# Patient Record
Sex: FEMALE | Race: WHITE | Employment: FULL TIME | URBAN - METROPOLITAN AREA
[De-identification: names, ages, dates, MRNs, and addresses within clinical notes are randomized per-mention and may not be internally consistent; named-entity substitution may affect disease eponyms.]

---

## 2017-07-26 ENCOUNTER — ROUTINE PRENATAL (OUTPATIENT)
Dept: PERINATAL CARE | Age: 27
End: 2017-07-26
Payer: COMMERCIAL

## 2017-07-26 VITALS
SYSTOLIC BLOOD PRESSURE: 121 MMHG | HEIGHT: 67 IN | DIASTOLIC BLOOD PRESSURE: 72 MMHG | TEMPERATURE: 98 F | HEART RATE: 112 BPM | RESPIRATION RATE: 16 BRPM | WEIGHT: 129 LBS | BODY MASS INDEX: 20.25 KG/M2

## 2017-07-26 DIAGNOSIS — O35.8XX0 SUSPECTED DAMAGE TO FETUS FROM OTHER DISEASE IN MOTHER, AFFECTING MANAGEMENT OF MOTHER, ANTEPARTUM CONDITION OR COMPLICATION, NOT APPLICABLE OR UNSPECIFIED FETUS: ICD-10-CM

## 2017-07-26 DIAGNOSIS — O26.12 LOW WEIGHT GAIN IN PREGNANCY, SECOND TRIMESTER: ICD-10-CM

## 2017-07-26 DIAGNOSIS — O99.891 MATERNAL RHEUMATOID ARTHRITIS COMPLICATING PREGNANCY (HCC): Primary | ICD-10-CM

## 2017-07-26 DIAGNOSIS — Z3A.18 18 WEEKS GESTATION OF PREGNANCY: ICD-10-CM

## 2017-07-26 DIAGNOSIS — M06.9 MATERNAL RHEUMATOID ARTHRITIS COMPLICATING PREGNANCY (HCC): Primary | ICD-10-CM

## 2017-07-26 DIAGNOSIS — Z36.86 ENCOUNTER FOR SCREENING FOR RISK OF PRE-TERM LABOR: ICD-10-CM

## 2017-07-26 PROBLEM — O26.10 LOW WEIGHT GAIN IN PREGNANCY: Status: ACTIVE | Noted: 2017-07-26

## 2017-07-26 PROCEDURE — G8420 CALC BMI NORM PARAMETERS: HCPCS | Performed by: OBSTETRICS & GYNECOLOGY

## 2017-07-26 PROCEDURE — 76817 TRANSVAGINAL US OBSTETRIC: CPT | Performed by: OBSTETRICS & GYNECOLOGY

## 2017-07-26 PROCEDURE — 76811 OB US DETAILED SNGL FETUS: CPT | Performed by: OBSTETRICS & GYNECOLOGY

## 2017-07-26 PROCEDURE — 1036F TOBACCO NON-USER: CPT | Performed by: OBSTETRICS & GYNECOLOGY

## 2017-07-26 PROCEDURE — 99214 OFFICE O/P EST MOD 30 MIN: CPT | Performed by: OBSTETRICS & GYNECOLOGY

## 2017-07-26 PROCEDURE — G8427 DOCREV CUR MEDS BY ELIG CLIN: HCPCS | Performed by: OBSTETRICS & GYNECOLOGY

## 2017-07-26 RX ORDER — PREDNISONE 1 MG/1
10 TABLET ORAL DAILY
COMMUNITY
Start: 2017-02-27 | End: 2019-06-10

## 2017-09-07 ENCOUNTER — ROUTINE PRENATAL (OUTPATIENT)
Dept: PERINATAL CARE | Age: 27
End: 2017-09-07
Payer: COMMERCIAL

## 2017-09-07 VITALS
TEMPERATURE: 97.9 F | HEART RATE: 91 BPM | BODY MASS INDEX: 21.93 KG/M2 | DIASTOLIC BLOOD PRESSURE: 76 MMHG | SYSTOLIC BLOOD PRESSURE: 117 MMHG | WEIGHT: 140 LBS | RESPIRATION RATE: 16 BRPM

## 2017-09-07 DIAGNOSIS — O99.891 MATERNAL RHEUMATOID ARTHRITIS COMPLICATING PREGNANCY (HCC): Primary | ICD-10-CM

## 2017-09-07 DIAGNOSIS — O26.12 LOW WEIGHT GAIN IN PREGNANCY, SECOND TRIMESTER: ICD-10-CM

## 2017-09-07 DIAGNOSIS — O35.8XX0 SUSPECTED DAMAGE TO FETUS FROM OTHER DISEASE IN MOTHER, AFFECTING MANAGEMENT OF MOTHER, ANTEPARTUM CONDITION OR COMPLICATION, NOT APPLICABLE OR UNSPECIFIED FETUS: ICD-10-CM

## 2017-09-07 DIAGNOSIS — M06.9 MATERNAL RHEUMATOID ARTHRITIS COMPLICATING PREGNANCY (HCC): Primary | ICD-10-CM

## 2017-09-07 DIAGNOSIS — Z3A.25 25 WEEKS GESTATION OF PREGNANCY: ICD-10-CM

## 2017-09-07 DIAGNOSIS — Z36.4 ANTENATAL SCREENING FOR FETAL GROWTH RETARDATION USING ULTRASONICS: ICD-10-CM

## 2017-09-07 PROCEDURE — 1036F TOBACCO NON-USER: CPT | Performed by: OBSTETRICS & GYNECOLOGY

## 2017-09-07 PROCEDURE — 76816 OB US FOLLOW-UP PER FETUS: CPT | Performed by: OBSTETRICS & GYNECOLOGY

## 2017-10-26 ENCOUNTER — ROUTINE PRENATAL (OUTPATIENT)
Dept: PERINATAL CARE | Age: 27
End: 2017-10-26
Payer: COMMERCIAL

## 2017-10-26 VITALS
HEART RATE: 85 BPM | DIASTOLIC BLOOD PRESSURE: 77 MMHG | SYSTOLIC BLOOD PRESSURE: 117 MMHG | BODY MASS INDEX: 23.81 KG/M2 | RESPIRATION RATE: 18 BRPM | TEMPERATURE: 98.4 F | WEIGHT: 152 LBS

## 2017-10-26 DIAGNOSIS — Z13.89 ENCOUNTER FOR ROUTINE SCREENING FOR MALFORMATION USING ULTRASONICS: ICD-10-CM

## 2017-10-26 DIAGNOSIS — O99.891 MATERNAL RHEUMATOID ARTHRITIS COMPLICATING PREGNANCY (HCC): Primary | ICD-10-CM

## 2017-10-26 DIAGNOSIS — O35.8XX0 SUSPECTED DAMAGE TO FETUS FROM DISEASE IN MOTHER, ANTEPARTUM CONDITION, SINGLE OR UNSPECIFIED FETUS: ICD-10-CM

## 2017-10-26 DIAGNOSIS — Z3A.32 32 WEEKS GESTATION OF PREGNANCY: ICD-10-CM

## 2017-10-26 DIAGNOSIS — Z36.4 ANTENATAL SCREENING FOR FETAL GROWTH RETARDATION USING ULTRASONICS: ICD-10-CM

## 2017-10-26 DIAGNOSIS — M06.9 MATERNAL RHEUMATOID ARTHRITIS COMPLICATING PREGNANCY (HCC): Primary | ICD-10-CM

## 2017-10-26 PROCEDURE — 76819 FETAL BIOPHYS PROFIL W/O NST: CPT | Performed by: OBSTETRICS & GYNECOLOGY

## 2017-10-26 PROCEDURE — 76805 OB US >/= 14 WKS SNGL FETUS: CPT | Performed by: OBSTETRICS & GYNECOLOGY

## 2017-11-22 ENCOUNTER — ROUTINE PRENATAL (OUTPATIENT)
Dept: PERINATAL CARE | Age: 27
End: 2017-11-22
Payer: COMMERCIAL

## 2017-11-22 VITALS
WEIGHT: 161 LBS | SYSTOLIC BLOOD PRESSURE: 125 MMHG | BODY MASS INDEX: 25.27 KG/M2 | RESPIRATION RATE: 18 BRPM | DIASTOLIC BLOOD PRESSURE: 70 MMHG | HEIGHT: 67 IN | TEMPERATURE: 97.5 F | HEART RATE: 90 BPM

## 2017-11-22 DIAGNOSIS — M06.9 MATERNAL RHEUMATOID ARTHRITIS COMPLICATING PREGNANCY (HCC): ICD-10-CM

## 2017-11-22 DIAGNOSIS — O35.8XX0 SUSPECTED DAMAGE TO FETUS FROM DISEASE IN MOTHER, ANTEPARTUM CONDITION, SINGLE OR UNSPECIFIED FETUS: Primary | ICD-10-CM

## 2017-11-22 DIAGNOSIS — O99.891 MATERNAL RHEUMATOID ARTHRITIS COMPLICATING PREGNANCY (HCC): ICD-10-CM

## 2017-11-22 DIAGNOSIS — Z3A.35 35 WEEKS GESTATION OF PREGNANCY: ICD-10-CM

## 2017-11-22 DIAGNOSIS — Z36.4 ANTENATAL SCREENING FOR FETAL GROWTH RETARDATION USING ULTRASONICS: ICD-10-CM

## 2017-11-22 PROCEDURE — 76816 OB US FOLLOW-UP PER FETUS: CPT | Performed by: OBSTETRICS & GYNECOLOGY

## 2017-11-22 PROCEDURE — 76819 FETAL BIOPHYS PROFIL W/O NST: CPT | Performed by: OBSTETRICS & GYNECOLOGY

## 2019-06-10 ENCOUNTER — HOSPITAL ENCOUNTER (OUTPATIENT)
Dept: CT IMAGING | Age: 29
Discharge: HOME | End: 2019-06-10
Payer: COMMERCIAL

## 2019-06-10 ENCOUNTER — HOSPITAL ENCOUNTER (EMERGENCY)
Age: 29
Discharge: ANOTHER HOSPITAL | End: 2019-06-10
Attending: FAMILY MEDICINE
Payer: COMMERCIAL

## 2019-06-10 VITALS
DIASTOLIC BLOOD PRESSURE: 85 MMHG | TEMPERATURE: 98.8 F | SYSTOLIC BLOOD PRESSURE: 126 MMHG | HEART RATE: 79 BPM | RESPIRATION RATE: 14 BRPM | OXYGEN SATURATION: 99 %

## 2019-06-10 DIAGNOSIS — R93.89 ABNORMAL CHEST X-RAY: ICD-10-CM

## 2019-06-10 DIAGNOSIS — J98.2 PNEUMOMEDIASTINUM (HCC): Primary | ICD-10-CM

## 2019-06-10 DIAGNOSIS — R07.81 PLEURITIC CHEST PAIN: ICD-10-CM

## 2019-06-10 PROCEDURE — 71260 CT THORAX DX C+: CPT

## 2019-06-10 PROCEDURE — 2709999900 HC NON-CHARGEABLE SUPPLY

## 2019-06-10 PROCEDURE — 6360000004 HC RX CONTRAST MEDICATION: Performed by: FAMILY MEDICINE

## 2019-06-10 PROCEDURE — 99285 EMERGENCY DEPT VISIT HI MDM: CPT

## 2019-06-10 RX ORDER — HYDROXYCHLOROQUINE SULFATE 200 MG/1
200 TABLET, FILM COATED ORAL 2 TIMES DAILY
COMMUNITY

## 2019-06-10 RX ADMIN — IOPAMIDOL 75 ML: 755 INJECTION, SOLUTION INTRAVENOUS at 18:05

## 2019-06-10 ASSESSMENT — ENCOUNTER SYMPTOMS
SHORTNESS OF BREATH: 0
VOMITING: 0
WHEEZING: 0
ABDOMINAL PAIN: 0
DIARRHEA: 0
CONSTIPATION: 0
NAUSEA: 0
COUGH: 0

## 2019-06-10 ASSESSMENT — PAIN DESCRIPTION - PAIN TYPE: TYPE: ACUTE PAIN

## 2019-06-10 ASSESSMENT — PAIN DESCRIPTION - LOCATION: LOCATION: CHEST

## 2019-06-10 NOTE — ED NOTES
Patient signed appropriate paperwork for transfer. Awaiting phone call from [facility] to know room to send patient to.       Jesse Riggs RN  06/10/19 1954

## 2019-06-10 NOTE — ED NOTES
Patient states she prefers to be admitted to [facility].  Dr. Cherry Augustin making the necessary phone calls for patient's admittance.       Yves Rojas RN  06/10/19 1932

## 2019-06-10 NOTE — ED PROVIDER NOTES
Allergies. FAMILY HISTORY     indicated that her mother is alive. She indicated that her father is alive. She indicated that her sister is alive. She indicated that her brother is alive. She indicated that her maternal grandmother is . She indicated that her maternal grandfather is alive. She indicated that her paternal grandmother is alive. She indicated that her paternal grandfather is . family history includes Cancer in her paternal grandfather; Heart Disease in her father, maternal grandfather, maternal grandmother, and paternal grandmother; High Cholesterol in her father; Hypertension in her maternal grandfather; Other in her father; Stroke in her maternal grandmother. SOCIAL HISTORY      reports that she has never smoked. She has never used smokeless tobacco. She reports that she does not drink alcohol or use drugs. PHYSICAL EXAM     INITIAL VITALS:  temporal temperature is 98.8 °F (37.1 °C). Her blood pressure is 134/90 (abnormal) and her pulse is 83. Her respiration is 12 and oxygen saturation is 99%. Physical Exam   Constitutional: She appears well-developed and well-nourished. No distress. Cardiovascular: Normal rate, regular rhythm and normal heart sounds. Pulmonary/Chest: Effort normal and breath sounds normal.   Abdominal: Soft. Bowel sounds are normal. She exhibits no distension and no mass. There is no tenderness. Skin: Skin is warm and dry. No crepitus of the anterior chest wall or about the neck. Psychiatric: She has a normal mood and affect. Her behavior is normal.   Nursing note and vitals reviewed. DIFFERENTIAL DIAGNOSIS:   Pneumomediastium, pneumothorax    DIAGNOSTIC RESULTS         RADIOLOGY: non-plain filmimages(s) such as CT, Ultrasound and MRI are read by the radiologist.  The patient had a CT chest with contrast performed prior to arrival. Report Reviewed     FINDINGS:   The thyroid gland is unremarkable.    There is mild to moderate amount of

## 2019-06-11 NOTE — ED NOTES
Patient left in stable condition on room air. Departing to [facility] via private auto. IV wrapped in coban. Report called to Brooke Pittman at [facility].       Yolanda Sylvester RN  06/10/19 2040

## 2022-06-22 ENCOUNTER — OFFICE VISIT (OUTPATIENT)
Dept: PRIMARY CARE CLINIC | Age: 32
End: 2022-06-22
Payer: COMMERCIAL

## 2022-06-22 VITALS
DIASTOLIC BLOOD PRESSURE: 72 MMHG | TEMPERATURE: 97.5 F | RESPIRATION RATE: 16 BRPM | SYSTOLIC BLOOD PRESSURE: 124 MMHG | WEIGHT: 153.2 LBS | BODY MASS INDEX: 24.04 KG/M2 | HEIGHT: 67 IN | HEART RATE: 81 BPM

## 2022-06-22 DIAGNOSIS — J02.9 SORE THROAT: Primary | ICD-10-CM

## 2022-06-22 LAB — S PYO AG THROAT QL: NORMAL

## 2022-06-22 PROCEDURE — 99213 OFFICE O/P EST LOW 20 MIN: CPT | Performed by: FAMILY MEDICINE

## 2022-06-22 PROCEDURE — G8420 CALC BMI NORM PARAMETERS: HCPCS | Performed by: FAMILY MEDICINE

## 2022-06-22 PROCEDURE — 87880 STREP A ASSAY W/OPTIC: CPT | Performed by: FAMILY MEDICINE

## 2022-06-22 PROCEDURE — G8427 DOCREV CUR MEDS BY ELIG CLIN: HCPCS | Performed by: FAMILY MEDICINE

## 2022-06-22 PROCEDURE — 1036F TOBACCO NON-USER: CPT | Performed by: FAMILY MEDICINE

## 2022-06-22 RX ORDER — AMOXICILLIN 875 MG/1
875 TABLET, COATED ORAL 2 TIMES DAILY
Qty: 20 TABLET | Refills: 0 | Status: SHIPPED | OUTPATIENT
Start: 2022-06-22 | End: 2022-07-02

## 2022-06-22 RX ORDER — NICOTINE POLACRILEX 4 MG/1
20 GUM, CHEWING ORAL DAILY
COMMUNITY

## 2022-06-22 RX ORDER — FAMOTIDINE 20 MG/1
20 TABLET, FILM COATED ORAL 2 TIMES DAILY
COMMUNITY

## 2022-06-22 ASSESSMENT — ENCOUNTER SYMPTOMS
SORE THROAT: 1
RHINORRHEA: 0
ALLERGIC/IMMUNOLOGIC NEGATIVE: 1
GASTROINTESTINAL NEGATIVE: 1
COUGH: 0
RESPIRATORY NEGATIVE: 1
EYES NEGATIVE: 1

## 2022-06-22 NOTE — PROGRESS NOTES
Subjective:      Patient ID: Bernard Fernandes is a 33 y.o. female. [facility]  Acute urgent care for pharyngitis . Started 2 days ago. Using ibuprofen for pain. No cough. Son positive for strep. Today. She has RA and is on plaquenil, currently not on actemra. Past Medical History:   Diagnosis Date    Rheumatoid arthritis ([address]     Shingles 12/03/14     Past Surgical History:   Procedure Laterality Date    HAMMER TOE SURGERY      WISDOM TOOTH EXTRACTION       Current Outpatient Medications   Medication Sig Dispense Refill    omeprazole 20 MG EC tablet Take 20 mg by mouth daily      famotidine (PEPCID) 20 MG tablet Take 20 mg by mouth 2 times daily      hydroxychloroquine (PLAQUENIL) 200 MG tablet Take 200 mg by mouth 2 times daily      tocilizumab (ACTEMRA) 162 MG/0.9ML SOSY injection Inject 162 mg into the skin every 14 days      NONFORMULARY Birth control. 1 tab by mouth daily       No current facility-administered medications for this visit. No Known Allergies      Review of Systems   Constitutional: Negative. Negative for fever. HENT: Positive for sore throat. Negative for congestion and rhinorrhea. Eyes: Negative. Respiratory: Negative. Negative for cough. Cardiovascular: Negative. Gastrointestinal: Negative. Endocrine: Negative. Genitourinary: Negative. Musculoskeletal: Negative. Skin: Negative. Allergic/Immunologic: Negative. Neurological: Negative. Hematological: Negative. Psychiatric/Behavioral: Negative. Objective:   Physical Exam  Constitutional:       General: She is not in acute distress. Appearance: She is well-developed. HENT:      Head: Normocephalic and atraumatic. Right Ear: External ear normal.      Left Ear: External ear normal.      Mouth/Throat:      Pharynx: Posterior oropharyngeal erythema (soft palate and uvula) present. No oropharyngeal exudate. Eyes:      General: No scleral icterus.      Conjunctiva/sclera: Conjunctivae normal.   Neck:      Thyroid: No thyromegaly. Cardiovascular:      Rate and Rhythm: Normal rate and regular rhythm. Heart sounds: Normal heart sounds. No murmur heard. Pulmonary:      Effort: Pulmonary effort is normal. No respiratory distress. Breath sounds: Normal breath sounds. No wheezing. Abdominal:      General: Bowel sounds are normal. There is no distension. Palpations: Abdomen is soft. Tenderness: There is no abdominal tenderness. There is no rebound. Musculoskeletal:         General: No tenderness. Normal range of motion. Cervical back: Neck supple. Skin:     General: Skin is warm and dry. Findings: No erythema or rash. Neurological:      Mental Status: She is alert and oriented to person, place, and time. Psychiatric:         Behavior: Behavior normal.         Thought Content: Thought content normal.         Judgment: Judgment normal.       /72   Pulse 81   Temp 97.5 °F (36.4 °C)   Resp 16   Ht 5' 7\" (1.702 m)   Wt 153 lb 3.2 oz (69.5 kg)   BMI 23.99 kg/m²   Office Visit on 06/22/2022   Component Date Value Ref Range Status    Strep A Ag 06/22/2022 None Detected  None Detected Final       Assessment:      Pharyngitis : presumed strep, son positive for strep today. Symptoms mostly sore throat. Immunocompromise due to RA treatement   Plan:      Amoxicillin bid x 10 days  Cont. Supportive care.    Recheck prn Bennet Jeans, MD